# Patient Record
Sex: FEMALE | Race: WHITE | NOT HISPANIC OR LATINO | Employment: OTHER | URBAN - METROPOLITAN AREA
[De-identification: names, ages, dates, MRNs, and addresses within clinical notes are randomized per-mention and may not be internally consistent; named-entity substitution may affect disease eponyms.]

---

## 2021-06-16 PROBLEM — E11.40 TYPE 2 DIABETES MELLITUS WITH DIABETIC NEUROPATHY, WITHOUT LONG-TERM CURRENT USE OF INSULIN (H): Status: ACTIVE | Noted: 2019-08-18

## 2021-06-16 PROBLEM — I10 ESSENTIAL HYPERTENSION, BENIGN: Status: ACTIVE | Noted: 2019-08-18

## 2021-06-16 PROBLEM — I50.33 ACUTE ON CHRONIC DIASTOLIC CONGESTIVE HEART FAILURE (H): Status: ACTIVE | Noted: 2019-08-18

## 2021-06-16 PROBLEM — R06.00 DYSPNEA: Status: ACTIVE | Noted: 2019-08-13

## 2021-06-16 PROBLEM — J96.01 ACUTE RESPIRATORY FAILURE WITH HYPOXIA (H): Status: ACTIVE | Noted: 2019-08-18

## 2021-06-16 PROBLEM — G47.33 OSA (OBSTRUCTIVE SLEEP APNEA): Status: ACTIVE | Noted: 2019-08-18

## 2021-06-16 PROBLEM — E78.2 MIXED HYPERLIPIDEMIA: Status: ACTIVE | Noted: 2019-08-18

## 2021-07-01 ASSESSMENT — MIFFLIN-ST. JEOR: SCORE: 1178.03

## 2021-07-02 ENCOUNTER — HOSPITAL ENCOUNTER (EMERGENCY)
Dept: EMERGENCY MEDICINE | Facility: HOSPITAL | Age: 81
Discharge: HOME | End: 2021-07-02
Attending: EMERGENCY MEDICINE
Payer: MEDICARE

## 2021-07-02 DIAGNOSIS — M25.562 ACUTE PAIN OF LEFT KNEE: ICD-10-CM

## 2021-07-02 DIAGNOSIS — M10.9 ACUTE GOUTY ARTHRITIS: ICD-10-CM

## 2021-07-02 LAB
ANION GAP SERPL CALCULATED.3IONS-SCNC: 16 MMOL/L (ref 5–18)
BNP SERPL-MCNC: 54 PG/ML (ref 0–159)
BUN SERPL-MCNC: 25 MG/DL (ref 8–28)
CALCIUM SERPL-MCNC: 10.3 MG/DL (ref 8.5–10.5)
CHLORIDE BLD-SCNC: 98 MMOL/L (ref 98–107)
CO2 SERPL-SCNC: 24 MMOL/L (ref 22–31)
CREAT SERPL-MCNC: 1.57 MG/DL (ref 0.6–1.1)
ERYTHROCYTE [DISTWIDTH] IN BLOOD BY AUTOMATED COUNT: 14.3 % (ref 11–14.5)
GFR SERPL CREATININE-BSD FRML MDRD: 32 ML/MIN/1.73M2
GLUCOSE BLD-MCNC: 185 MG/DL (ref 70–125)
HCT VFR BLD AUTO: 31.7 % (ref 35–47)
HGB BLD-MCNC: 9.7 G/DL (ref 12–16)
MCH RBC QN AUTO: 27.8 PG (ref 27–34)
MCHC RBC AUTO-ENTMCNC: 30.6 G/DL (ref 32–36)
MCV RBC AUTO: 91 FL (ref 80–100)
PLATELET # BLD AUTO: 357 THOU/UL (ref 140–440)
PMV BLD AUTO: 9.1 FL (ref 8.5–12.5)
POTASSIUM BLD-SCNC: 4.7 MMOL/L (ref 3.5–5)
RBC # BLD AUTO: 3.49 MILL/UL (ref 3.8–5.4)
SODIUM SERPL-SCNC: 138 MMOL/L (ref 136–145)
TROPONIN I SERPL-MCNC: 0.01 NG/ML (ref 0–0.29)
URATE SERPL-MCNC: 11.1 MG/DL (ref 2–7.5)
WBC: 13.4 THOU/UL (ref 4–11)

## 2021-07-02 RX ORDER — OXYCODONE AND ACETAMINOPHEN 5; 325 MG/1; MG/1
1 TABLET ORAL EVERY 4 HOURS PRN
Qty: 13 TABLET | Refills: 0 | Status: SHIPPED | OUTPATIENT
Start: 2021-07-02

## 2021-07-04 NOTE — ED PROVIDER NOTES
ED Provider Notes by Andrew Lenz MD at 7/2/2021 12:54 AM     Author: Andrew Lenz MD Service: Emergency Medicine Author Type: Physician    Filed: 7/2/2021  3:23 AM Date of Service: 7/2/2021 12:54 AM Status: Signed    : Andrew Lenz MD (Physician)       EMERGENCY DEPARTMENT ENCOUNTER    NAME: Marietta Costello  AGE: 81 y.o. female  YOB: 1940  MRN: [id number]  EVALUATION DATE & TIME: 7/2/2021 12:53 AM    PCP: Cait Borrego MD Christopher Wall, M.D.    Chief Complaint   Patient presents with   ? Leg Swelling     FINAL IMPRESSION:  1. Acute pain of left knee    2. Acute gouty arthritis      ED COURSE & MEDICAL DECISION MAKING:    Pertinent Labs & Imaging studies reviewed. (See chart for details)  Marietta Costello is a 81 y.o. female who presents to the ER for left knee swelling and pain. Initial vitals reviewed. Exam notable for tender along the medial aspect of the knee at the joint capsule and collateral ligament, no dramatic effusion felt, no erythema, slight hyperemia.       Differential includes but is not limited to gout, septic arthritis, DVT, medial collateral ligament sprain, bursitis.  Patient with pain that was nontraumatic.  He does have a history of gout and has been having her diuretics adjusted to help with the lower extremity swelling.  This possibly could have triggered a gout flare though usually she has it in her feet and not in the knee.  Labs obtained from triage did show elevated uric acid on labs and pain is consistent with gout type pain with slight hyperemia in the knee but no erythema but also not showing large effusion.  X-ray was obtained on top of the labs which did show some leukocytosis though possibly reactionary as is not significant elevated.  Patient has no systemic symptoms and x-ray showed very small effusion as well as calcification laterally which patient does have a scar from a surgery at 5 years old.  Patient does not recall what it  was from where there was an injury or abnormality but reports of surgery at the time that she does not recall.  Likely this is what is contributing to to the findings on x-ray and not contributing to pain.  Given the hyperemia and history of gout he feels is likely gouty flare though in the knee and recheck of her labs from outside clinics show no significant changes in creatinine but given the increase in Bumex that she is taking this may be contributing to some concentration of uric acid.  I discussed this with patient and presently she does not wish to change her Bumex dosing however will start patient on prednisone as she cannot take NSAIDs given the creatinine.  No posterior calf tenderness or medial thigh tenderness and unlikely DVT.  As well she does have to monitor her blood sugar which I did warn her about with the steroids and will also give her stronger pain meds for home.  Patient does have crutches at home that she came in on and will place a knee immobilizer to help with transfer weight while she ambulates with the crutches.    12:56 AM I met with the patient to gather history and to perform my initial exam. I discussed the plan for care while in the Emergency Department. PPE (gloves, eye protection, surgical cap, and surgical mask) was worn by me during patient encounters while patient wore mask.   2:09 AM I re-evaluated patient and updated her on results. We discussed plans for discharge and patient is agreeable.     Prior to disposition, all patient concerns were addressed and opportunity to ask additional questions was given.  Patient was comfortable with this treatment plan and expressed understanding of all instructions.  At the conclusion of the encounter I discussed the results of all of the tests and the disposition. The questions were answered. The patient or family acknowledged understanding and was agreeable with the care plan.     0 minutes of critical care time excluding procedures.      MEDICATIONS GIVEN IN THE EMERGENCY:  Medications   oxyCODONE immediate release tablet 10 mg (ROXICODONE) (10 mg Oral Given 7/2/21 0124)   ondansetron injection 4 mg (ZOFRAN) (4 mg Intravenous Given 7/2/21 0124)   predniSONE tablet 20 mg (DELTASONE) (20 mg Oral Given 7/2/21 0245)       NEW PRESCRIPTIONS STARTED AT TODAY'S ER VISIT  Discharge Medication List as of 7/2/2021  2:55 AM      START taking these medications    Details   oxyCODONE-acetaminophen (PERCOCET/ENDOCET) 5-325 mg per tablet Take 1 tablet by mouth every 4 (four) hours as needed for pain., Starting Fri 7/2/2021, Print      predniSONE (DELTASONE) 10 mg tablet Take 20 mg by mouth daily for 5 days., Starting Fri 7/2/2021, Until Wed 7/7/2021, Print         CONTINUE these medications which have NOT CHANGED    Details   albuterol (PROAIR HFA;PROVENTIL HFA;VENTOLIN HFA) 90 mcg/actuation inhaler Inhale 1-2 puffs every 6 (six) hours as needed for wheezing., Starting Sat 11/30/2019, Print      artificial tears,hypromellose, (GENTEAL; SYSTANE) 0.3 % Gel Administer 1-2 drops to both eyes every 4 (four) hours as needed., Historical Med      aspirin 81 MG EC tablet Take 81 mg by mouth every morning., Starting Tue 8/18/2015, Historical Med      carvedilol (COREG) 25 MG tablet Take 50 mg by mouth 2 (two) times a day., Starting Fri 8/9/2019, Historical Med      esomeprazole (NEXIUM 24HR) 20 MG capsule Take 1 capsule by mouth every morning., Starting Tue 4/12/2016, Historical Med      furosemide (LASIX) 40 MG tablet Take 1 tablet (40 mg total) by mouth daily., Starting Thu 8/15/2019, Normal      gabapentin (NEURONTIN) 100 MG capsule Take 100 mg by mouth 2 (two) times a day., Starting Tue 10/9/2018, Historical Med      irbesartan (AVAPRO) 150 MG tablet Take 75 mg by mouth every morning., Starting Fri 8/9/2019, Historical Med      loperamide (IMODIUM) 2 mg capsule Take 2 mg by mouth 4 (four) times a day as needed for diarrhea., Historical Med      metFORMIN  (GLUCOPHAGE) 1000 MG tablet Take 1,000 mg by mouth 2 (two) times a day with meals., Starting Tue 7/2/2019, Historical Med      pramipexole (MIRAPEX) 0.5 MG tablet Take 0.5-1 mg by mouth at bedtime as needed., Starting Wed 6/19/2019, Historical Med      pravastatin (PRAVACHOL) 10 MG tablet Take 10 mg by mouth at bedtime., Starting Fri 7/12/2019, Historical Med      traZODone (DESYREL) 50 MG tablet Take 25-50 mg by mouth at bedtime as needed., Starting Tue 3/26/2019, Historical Med              =================================================================    HPI    Patient information was obtained from: Patient    Use of : N/A     Marietta Costello is a 81 y.o. female with a history of gout, CHF, DM II, CKD-4, HLD, HTN, who presents for evaluation of left knee pain.     Patient reports she woke up this morning with swelling, warmth, and pain to left knee. Pain is worsened by moving knee or bearing weight on left leg. No calf tenderness. Denies any known injury or trauma. Patient notes a history of gout in her feet but denies prior episodes of gout in her knees. She has taken prednisone for gout previously.     Of note, patient is currently having her diuretic medication doses changed with her PCP. She has not taken her medications tonight. Last lab work was done on 6/30/21. Patient notes increased lower extremity swelling recently.    Patient denies additional medical concerns or complaints at this time.      REVIEW OF SYSTEMS   Review of Systems   Cardiovascular: Positive for leg swelling.   Musculoskeletal:        Positive for left knee pain and swelling.   All other systems reviewed and are negative.     PAST MEDICAL HISTORY:  History reviewed. No pertinent past medical history.    PAST SURGICAL HISTORY:  History reviewed. No pertinent surgical history.    CURRENT MEDICATIONS:    No current facility-administered medications on file prior to encounter.      Current Outpatient Medications on File Prior to  Encounter   Medication Sig   ? albuterol (PROAIR HFA;PROVENTIL HFA;VENTOLIN HFA) 90 mcg/actuation inhaler Inhale 1-2 puffs every 6 (six) hours as needed for wheezing.   ? artificial tears,hypromellose, (GENTEAL; SYSTANE) 0.3 % Gel Administer 1-2 drops to both eyes every 4 (four) hours as needed.   ? aspirin 81 MG EC tablet Take 81 mg by mouth every morning.   ? carvedilol (COREG) 25 MG tablet Take 50 mg by mouth 2 (two) times a day.   ? esomeprazole (NEXIUM 24HR) 20 MG capsule Take 1 capsule by mouth every morning.   ? furosemide (LASIX) 40 MG tablet Take 1 tablet (40 mg total) by mouth daily.   ? gabapentin (NEURONTIN) 100 MG capsule Take 100 mg by mouth 2 (two) times a day.   ? irbesartan (AVAPRO) 150 MG tablet Take 75 mg by mouth every morning.   ? loperamide (IMODIUM) 2 mg capsule Take 2 mg by mouth 4 (four) times a day as needed for diarrhea.   ? metFORMIN (GLUCOPHAGE) 1000 MG tablet Take 1,000 mg by mouth 2 (two) times a day with meals.   ? pramipexole (MIRAPEX) 0.5 MG tablet Take 0.5-1 mg by mouth at bedtime as needed.   ? pravastatin (PRAVACHOL) 10 MG tablet Take 10 mg by mouth at bedtime.   ? traZODone (DESYREL) 50 MG tablet Take 25-50 mg by mouth at bedtime as needed.       ALLERGIES:  No Known Allergies    FAMILY HISTORY:  History reviewed. No pertinent family history.    SOCIAL HISTORY:   Social History     Socioeconomic History   ? Marital status:      Spouse name: None   ? Number of children: None   ? Years of education: None   ? Highest education level: None   Occupational History   ? None   Social Needs   ? Financial resource strain: None   ? Food insecurity     Worry: None     Inability: None   ? Transportation needs     Medical: None     Non-medical: None   Tobacco Use   ? Smoking status: Never Smoker   ? Smokeless tobacco: Never Used   Substance and Sexual Activity   ? Alcohol use: Not Currently   ? Drug use: Never   ? Sexual activity: None   Lifestyle   ? Physical activity     Days per  "week: None     Minutes per session: None   ? Stress: None   Relationships   ? Social connections     Talks on phone: None     Gets together: None     Attends [employer] service: None     Active member of club or organization: None     Attends meetings of clubs or organizations: None     Relationship status: None   ? Intimate partner violence     Fear of current or ex partner: None     Emotionally abused: None     Physically abused: None     Forced sexual activity: None   Other Topics Concern   ? None   Social History Narrative   ? None       VITALS:  Patient Vitals for the past 24 hrs:   BP Temp Temp src Pulse Resp SpO2 Height Weight   07/01/21 2244 145/65 100.1  F (37.8  C) Oral 82 16 91 % 5' 3\" (1.6 m) 164 lb (74.4 kg)       PHYSICAL EXAM    Physical Exam   Constitutional: She appears well-developed and well-nourished. No distress.   HENT:   Head: Normocephalic.   Cardiovascular: Normal rate, regular rhythm, normal heart sounds and intact distal pulses.   Pulses:       Dorsalis pedis pulses are 2+ on the right side and 2+ on the left side.   Pulmonary/Chest: Effort normal. No respiratory distress.   Musculoskeletal:         General: Tenderness present. No deformity or edema.      Left knee: She exhibits swelling (Hyperemia). She exhibits normal range of motion, no effusion, no erythema, normal alignment, no LCL laxity, normal patellar mobility, no bony tenderness, normal meniscus and no MCL laxity. Tenderness found. Medial joint line tenderness noted.   Neurological: She is alert. Coordination normal.   Skin: Skin is warm and dry. No rash noted. She is not diaphoretic. No erythema. No pallor.   Psychiatric: Her behavior is normal.   Nursing note and vitals reviewed.       LAB:  All pertinent labs reviewed and interpreted.  Results for orders placed or performed during the hospital encounter of 07/02/21   Basic Metabolic Panel   Result Value Ref Range    Sodium 138 136 - 145 mmol/L    Potassium 4.7 3.5 - 5.0 mmol/L "    Chloride 98 98 - 107 mmol/L    CO2 24 22 - 31 mmol/L    Anion Gap, Calculation 16 5 - 18 mmol/L    Glucose 185 (H) 70 - 125 mg/dL    Calcium 10.3 8.5 - 10.5 mg/dL    BUN 25 8 - 28 mg/dL    Creatinine 1.57 (H) 0.60 - 1.10 mg/dL    GFR MDRD Af Amer 38 (L) >60 mL/min/1.73m2    GFR MDRD Non Af Amer 32 (L) >60 mL/min/1.73m2   HM2 (CBC W/O DIFF)   Result Value Ref Range    WBC 13.4 (H) 4.0 - 11.0 thou/uL    RBC 3.49 (L) 3.80 - 5.40 mill/uL    Hemoglobin 9.7 (L) 12.0 - 16.0 g/dL    Hematocrit 31.7 (L) 35.0 - 47.0 %    MCV 91 80 - 100 fL    MCH 27.8 27.0 - 34.0 pg    MCHC 30.6 (L) 32.0 - 36.0 g/dL    RDW 14.3 11.0 - 14.5 %    Platelets 357 140 - 440 thou/uL    MPV 9.1 8.5 - 12.5 fL   Troponin I   Result Value Ref Range    Troponin I 0.01 0.00 - 0.29 ng/mL   BNP(B-type Natriuretic Peptide)   Result Value Ref Range    BNP 54 0 - 159 pg/mL   Uric Acid   Result Value Ref Range    Uric Acid 11.1 (H) 2.0 - 7.5 mg/dL       RADIOLOGY:  Reviewed all pertinent imaging. Please see official radiology report.  Xr Knee Left 1 Or 2 Vws    Result Date: 7/2/2021  EXAM: XR KNEE LEFT 1 OR 2 VWS LOCATION: [facility] DATE/TIME: 7/2/2021 1:20 AM INDICATION: eval effusion knee swelling COMPARISON: None     No fracture. Normal alignment. Small effusion. Mild medial soft tissue swelling. Along the posterior and lateral aspects of the knee near the fibular head is a somewhat flattened patch of irregular calcific densities, which may be postsurgical there are posttraumatic, or possibly outside the patient, please correlate clinically. The abnormality is of uncertain etiology but doubtful clinical significance.    EKG:    Performed at: 11:22 PM on July 1, 2021.    Impression: Normal sinus rhythm, no signs of acute ischemia or arrhythmia.    Rate: 77 bpm  Rhythm: Normal sinus rhythm   Axis: Normal axis  MN Interval: 174 ms  QRS Interval: 78 ms  QTc Interval: 434 ms  ST Changes: No ST elevations or depressions.  Comparison:  Compared to prior EKG from November 30, 2019 no acute changes.    I have independently reviewed and interpreted the EKG(s) documented above.    PROCEDURES:   Procedures        I, Rosaura Barone, am serving as a scribe to document services personally performed by Dr. Andrew Lenz MD based on my observation and the provider's statements to me. I, Andrew Lenz MD attest that Rosaura Barone is acting in a scribe capacity, has observed my performance of the services and has documented them in accordance with my direction.    Andrew Lenz M.D.  Emergency Medicine  [facility] EMERGENCY DEPARTMENT  [address]  [facility] [address]  Dept: [phone]  Loc: [phone]     Andrew Lenz MD  07/02/21 0323

## 2021-07-04 NOTE — ED TRIAGE NOTES
ED Triage Notes by Evelyne Bosch RN at 7/1/2021 10:40 PM     Author: Evelyne Bosch RN Service: Emergency Medicine Author Type: Registered Nurse    Filed: 7/1/2021 10:45 PM Date of Service: 7/1/2021 10:40 PM Status: Addendum    : Evleyne Bosch RN (Registered Nurse)    Related Notes: Original Note by Evelyne Bosch RN (Registered Nurse) filed at 7/1/2021 10:44 PM       Pt reports waking up with left knee pain and swelling today. Denies recent trauma to knee. Pt also endorses bilateral foot/leg swelling over last few weeks. Pt has had recent med changes with PCP involving dosing changes to diuretics. Pt denies increased shortness of breath. Pt is on 4L of O2 at baseline with O2 sat 90-91%. Pt has had to increase O2 while sleeping to 5L. Pt has hx of gout and recently finished med for gout.

## 2021-07-06 VITALS — WEIGHT: 164 LBS | BODY MASS INDEX: 29.06 KG/M2 | HEIGHT: 63 IN

## 2021-07-06 LAB
ATRIAL RATE - MUSE: 77 BPM
DIASTOLIC BLOOD PRESSURE - MUSE: NORMAL
INTERPRETATION ECG - MUSE: NORMAL
P AXIS - MUSE: 60 DEGREES
PR INTERVAL - MUSE: 174 MS
QRS DURATION - MUSE: 78 MS
QT - MUSE: 384 MS
QTC - MUSE: 434 MS
R AXIS - MUSE: -21 DEGREES
SYSTOLIC BLOOD PRESSURE - MUSE: NORMAL
T AXIS - MUSE: 37 DEGREES
VENTRICULAR RATE- MUSE: 77 BPM

## 2021-09-21 ENCOUNTER — NURSE TRIAGE (OUTPATIENT)
Dept: NURSING | Facility: CLINIC | Age: 81
End: 2021-09-21

## 2021-09-21 NOTE — TELEPHONE ENCOUNTER
Patient calling reporting her blood sugar is greater than 300. States she has had several other times where her blood sugar has been higher than 300. Reports feeling light headed. Denies fever. Patient states her PCP is with [facility]. Advised to page her PCP now. Informed if unable to get hold of an on call provider to be seen at the emergency department. Caller verbalized understanding. Denies further questions.      Jamal Liz RN  [facility]         Reason for Disposition    [1] Blood glucose > 300 mg/dL (16.7 mmol/L) AND [2] two or more times in a row    Additional Information    Negative: Unconscious or difficult to awaken    Negative: Acting confused (e.g., disoriented, slurred speech)    Negative: Very weak (e.g., can't stand)    Negative: Sounds like a life-threatening emergency to the triager    Negative: [1] Vomiting AND [2] signs of dehydration (e.g., very dry mouth, lightheaded, dark urine)    Negative: [1] Blood glucose > 240 mg/dL (13.3 mmol/L) AND [2] rapid breathing    Negative: Blood glucose > 500 mg/dL (27.8 mmol/L)    Negative: [1] Blood glucose > 240 mg/dL (13.3 mmol/L) AND [2] urine ketones moderate-large (or more than 1+)    Negative: [1] Blood glucose > 240 mg/dL (13.3 mmol/L) AND [2] blood ketones > 1.4 mmol/L    Negative: [1] Blood glucose > 240 mg/dL (13.3 mmol/L) AND [2] vomiting AND [3] unable to check for ketones (in blood or urine)    Negative: [1] New onset diabetes suspected (e.g., frequent urination, weak, weight loss) AND [2] vomiting or rapid breathing    Negative: Vomiting lasts > 4 hours    Negative: Patient sounds very sick or weak to the triager    Negative: Fever > 100.4 F (38.0 C)    Negative: Blood glucose > 400 mg/dL (22.2 mmol/L)    Protocols used: DIABETES - HIGH BLOOD SUGAR-A-

## 2021-09-21 NOTE — TELEPHONE ENCOUNTER
Daughter is calling with patient on speaker phone. Caller says patient blood glucose levels have been elevated over 400 x3. Patient does not take any insulin. Caller denies any vomiting, but does says she feels weak. Caller denies any fever.  Triage guidelines recommend to call pcp now  Caller verbalized and understands directives.  COVID 19 Nurse Triage Plan/Patient Instructions    Please be aware that novel coronavirus (COVID-19) may be circulating in the community. If you develop symptoms such as fever, cough, or SOB or if you have concerns about the presence of another infection including coronavirus (COVID-19), please contact your health care provider or visit [email or web address].     Disposition/Instructions    Virtual Visit with provider recommended. Reference Visit Selection Guide.    Thank you for taking steps to prevent the spread of this virus.  o Limit your contact with others.  o Wear a simple mask to cover your cough.  o Wash your hands well and often.    Resources    [facility]: About COVID-19: [email or web address]    CDC: What to Do If You're Sick: www.cdc.gov/coronavirus/2019-ncov/about/steps-when-sick.html    CDC: Ending Home Isolation: www.cdc.gov/coronavirus/2019-ncov/hcp/disposition-in-home-patients.html     CDC: Caring for Someone: www.cdc.gov/coronavirus/2019-ncov/if-you-are-sick/care-for-someone.html     [facility]: Interim Guidance for Hospital Discharge to Home: www.health.[facility].mn.us/diseases/coronavirus/hcp/hospdischarge.pdf    [facility] clinical trials (COVID-19 research studies): clinicalaffairs.[facility]/umn-clinical-trials     Below are the COVID-19 hotlines at the [facility] of Health ([facility]). Interpreters are available.   o For health questions: Call 184-225-2455 or 1-622.150.9057 (7 a.m. to 7 p.m.)  o For questions about schools and childcare: Call 171-255-7662 or 1-871.268.7770 (7 a.m. to 7 p.m.)                     Reason for  Disposition    Blood glucose > 400 mg/dL (22.2 mmol/L)    Additional Information    Negative: Unconscious or difficult to awaken    Negative: Acting confused (e.g., disoriented, slurred speech)    Negative: Very weak (e.g., can't stand)    Negative: Sounds like a life-threatening emergency to the triager    Negative: [1] Vomiting AND [2] signs of dehydration (e.g., very dry mouth, lightheaded, dark urine)    Negative: [1] Blood glucose > 240 mg/dL (13.3 mmol/L) AND [2] rapid breathing    Negative: Blood glucose > 500 mg/dL (27.8 mmol/L)    Negative: [1] Blood glucose > 240 mg/dL (13.3 mmol/L) AND [2] urine ketones moderate-large (or more than 1+)    Negative: [1] Blood glucose > 240 mg/dL (13.3 mmol/L) AND [2] blood ketones > 1.4 mmol/L    Negative: [1] Blood glucose > 240 mg/dL (13.3 mmol/L) AND [2] vomiting AND [3] unable to check for ketones (in blood or urine)    Negative: [1] New onset diabetes suspected (e.g., frequent urination, weak, weight loss) AND [2] vomiting or rapid breathing    Negative: Vomiting lasts > 4 hours    Negative: Patient sounds very sick or weak to the triager    Negative: Fever > 100.4 F (38.0 C)    Protocols used: DIABETES - HIGH BLOOD SUGAR-A-

## 2022-02-18 ENCOUNTER — HOSPITAL ENCOUNTER (OUTPATIENT)
Dept: ULTRASOUND IMAGING | Facility: HOSPITAL | Age: 82
End: 2022-02-18
Payer: MEDICARE

## 2022-02-18 ENCOUNTER — HOSPITAL ENCOUNTER (OUTPATIENT)
Dept: ULTRASOUND IMAGING | Facility: HOSPITAL | Age: 82
End: 2022-02-18
Attending: PHYSICIAN ASSISTANT
Payer: MEDICARE

## 2022-02-18 DIAGNOSIS — E11.9 DIABETES MELLITUS (H): ICD-10-CM

## 2022-02-18 DIAGNOSIS — N18.32 CHRONIC KIDNEY DISEASE (CKD) STAGE G3B/A1, MODERATELY DECREASED GLOMERULAR FILTRATION RATE (GFR) BETWEEN 30-44 ML/MIN/1.73 SQUARE METER AND ALBUMINURIA CREATININE RATIO LESS THAN 30 MG/G (H): ICD-10-CM

## 2022-02-18 DIAGNOSIS — M79.89 LEG SWELLING: ICD-10-CM

## 2022-02-18 DIAGNOSIS — I10 HYPERTENSION: ICD-10-CM

## 2022-02-18 DIAGNOSIS — N17.9 AKI (ACUTE KIDNEY INJURY) (H): ICD-10-CM

## 2022-02-18 PROCEDURE — 93971 EXTREMITY STUDY: CPT | Mod: RT

## 2022-02-18 PROCEDURE — 76770 US EXAM ABDO BACK WALL COMP: CPT

## 2022-03-10 ENCOUNTER — HOSPITAL ENCOUNTER (OUTPATIENT)
Dept: CT IMAGING | Facility: HOSPITAL | Age: 82
Discharge: HOME | End: 2022-03-10
Admitting: RADIOLOGY
Payer: MEDICARE

## 2022-03-10 DIAGNOSIS — R06.00 DYSPNEA, UNSPECIFIED TYPE: ICD-10-CM

## 2022-03-10 DIAGNOSIS — I27.20 PHT (PULMONARY HYPERTENSION) (H): ICD-10-CM

## 2022-03-10 PROCEDURE — 71250 CT THORAX DX C-: CPT

## 2022-07-26 ENCOUNTER — HOSPITAL ENCOUNTER (OUTPATIENT)
Dept: CARDIOLOGY | Facility: HOSPITAL | Age: 82
Discharge: HOME | End: 2022-07-26
Attending: INTERNAL MEDICINE | Admitting: INTERNAL MEDICINE
Payer: MEDICARE

## 2022-07-26 DIAGNOSIS — I27.20 PULMONARY HYPERTENSION (H): ICD-10-CM

## 2022-07-26 LAB — LVEF ECHO: NORMAL

## 2022-07-26 PROCEDURE — 93306 TTE W/DOPPLER COMPLETE: CPT | Mod: 26 | Performed by: INTERNAL MEDICINE

## 2022-07-26 PROCEDURE — 93306 TTE W/DOPPLER COMPLETE: CPT
